# Patient Record
Sex: MALE | Race: WHITE | NOT HISPANIC OR LATINO | URBAN - METROPOLITAN AREA
[De-identification: names, ages, dates, MRNs, and addresses within clinical notes are randomized per-mention and may not be internally consistent; named-entity substitution may affect disease eponyms.]

---

## 2023-08-29 ENCOUNTER — AMBULATORY SURGICAL CENTER (OUTPATIENT)
Dept: URBAN - METROPOLITAN AREA SURGERY 3 | Facility: SURGERY | Age: 53
End: 2023-08-29
Payer: COMMERCIAL

## 2023-08-29 ENCOUNTER — OFFICE (OUTPATIENT)
Dept: URBAN - METROPOLITAN AREA PATHOLOGY 12 | Facility: PATHOLOGY | Age: 53
End: 2023-08-29
Payer: COMMERCIAL

## 2023-08-29 VITALS
OXYGEN SATURATION: 98 % | SYSTOLIC BLOOD PRESSURE: 114 MMHG | RESPIRATION RATE: 17 BRPM | OXYGEN SATURATION: 98 % | SYSTOLIC BLOOD PRESSURE: 148 MMHG | HEART RATE: 87 BPM | DIASTOLIC BLOOD PRESSURE: 80 MMHG | TEMPERATURE: 97.3 F | HEART RATE: 79 BPM | OXYGEN SATURATION: 92 % | HEART RATE: 88 BPM | DIASTOLIC BLOOD PRESSURE: 77 MMHG | HEART RATE: 89 BPM | OXYGEN SATURATION: 92 % | DIASTOLIC BLOOD PRESSURE: 77 MMHG | TEMPERATURE: 97.3 F | DIASTOLIC BLOOD PRESSURE: 79 MMHG | RESPIRATION RATE: 19 BRPM | SYSTOLIC BLOOD PRESSURE: 114 MMHG | OXYGEN SATURATION: 94 % | HEART RATE: 82 BPM | HEART RATE: 82 BPM | SYSTOLIC BLOOD PRESSURE: 118 MMHG | DIASTOLIC BLOOD PRESSURE: 67 MMHG | WEIGHT: 225 LBS | SYSTOLIC BLOOD PRESSURE: 130 MMHG | RESPIRATION RATE: 17 BRPM | DIASTOLIC BLOOD PRESSURE: 74 MMHG | RESPIRATION RATE: 18 BRPM | RESPIRATION RATE: 17 BRPM | HEART RATE: 87 BPM | HEART RATE: 82 BPM | OXYGEN SATURATION: 94 % | SYSTOLIC BLOOD PRESSURE: 127 MMHG | SYSTOLIC BLOOD PRESSURE: 118 MMHG | WEIGHT: 225 LBS | DIASTOLIC BLOOD PRESSURE: 74 MMHG | TEMPERATURE: 97.7 F | HEART RATE: 89 BPM | SYSTOLIC BLOOD PRESSURE: 117 MMHG | HEART RATE: 79 BPM | HEART RATE: 89 BPM | SYSTOLIC BLOOD PRESSURE: 130 MMHG | DIASTOLIC BLOOD PRESSURE: 80 MMHG | DIASTOLIC BLOOD PRESSURE: 80 MMHG | DIASTOLIC BLOOD PRESSURE: 79 MMHG | SYSTOLIC BLOOD PRESSURE: 130 MMHG | TEMPERATURE: 97.3 F | OXYGEN SATURATION: 96 % | SYSTOLIC BLOOD PRESSURE: 117 MMHG | SYSTOLIC BLOOD PRESSURE: 114 MMHG | SYSTOLIC BLOOD PRESSURE: 117 MMHG | HEIGHT: 69 IN | OXYGEN SATURATION: 98 % | SYSTOLIC BLOOD PRESSURE: 118 MMHG | HEART RATE: 87 BPM | DIASTOLIC BLOOD PRESSURE: 67 MMHG | HEART RATE: 88 BPM | HEART RATE: 88 BPM | HEIGHT: 69 IN | HEART RATE: 79 BPM | OXYGEN SATURATION: 96 % | TEMPERATURE: 97.7 F | TEMPERATURE: 97.7 F | SYSTOLIC BLOOD PRESSURE: 148 MMHG | OXYGEN SATURATION: 92 % | DIASTOLIC BLOOD PRESSURE: 77 MMHG | OXYGEN SATURATION: 96 % | DIASTOLIC BLOOD PRESSURE: 79 MMHG | RESPIRATION RATE: 18 BRPM | DIASTOLIC BLOOD PRESSURE: 67 MMHG | SYSTOLIC BLOOD PRESSURE: 127 MMHG | SYSTOLIC BLOOD PRESSURE: 127 MMHG | DIASTOLIC BLOOD PRESSURE: 74 MMHG | RESPIRATION RATE: 19 BRPM | HEIGHT: 69 IN | RESPIRATION RATE: 19 BRPM | OXYGEN SATURATION: 94 % | SYSTOLIC BLOOD PRESSURE: 148 MMHG | WEIGHT: 225 LBS | RESPIRATION RATE: 18 BRPM

## 2023-08-29 DIAGNOSIS — K63.5 POLYP OF COLON: ICD-10-CM

## 2023-08-29 DIAGNOSIS — D12.4 BENIGN NEOPLASM OF DESCENDING COLON: ICD-10-CM

## 2023-08-29 DIAGNOSIS — D12.2 BENIGN NEOPLASM OF ASCENDING COLON: ICD-10-CM

## 2023-08-29 DIAGNOSIS — Z12.11 ENCOUNTER FOR SCREENING FOR MALIGNANT NEOPLASM OF COLON: ICD-10-CM

## 2023-08-29 PROCEDURE — 88305 TISSUE EXAM BY PATHOLOGIST: CPT

## 2023-08-29 PROCEDURE — 45385 COLONOSCOPY W/LESION REMOVAL: CPT | Mod: 33 | Performed by: STUDENT IN AN ORGANIZED HEALTH CARE EDUCATION/TRAINING PROGRAM

## 2023-08-29 NOTE — SERVICEHPINOTES
Mr. Francisco Hooper is a 52 yo male who was referred for routine screening colonoscopy with no prior history of colonoscopy performed.  Patient denies prior positive Stool DNA or Hemoccult tests.   Patient is not currently taking Coumadin, Plavix or any other blood thinners. Patient denies history of heart or lung problems. Patient denies history of chest pain or shortness of breath. No history of kidney disease, renal dialysis, bleeding problems, or liver disease. Denies any hospitalizations or operations in the last six months. No history of rectal bleeding. Patient denies any other GI symptoms. Not on blood thinners, No FHx of CRC. No previous abd surgeries

## 2023-08-29 NOTE — SERVICEHPINOTES
Mr. Francisco Hooper is a 54 yo male who was referred for routine screening colonoscopy with no prior history of colonoscopy performed.  Patient denies prior positive Stool DNA or Hemoccult tests.   Patient is not currently taking Coumadin, Plavix or any other blood thinners. Patient denies history of heart or lung problems. Patient denies history of chest pain or shortness of breath. No history of kidney disease, renal dialysis, bleeding problems, or liver disease. Denies any hospitalizations or operations in the last six months. No history of rectal bleeding. Patient denies any other GI symptoms. Not on blood thinners, No FHx of CRC. No previous abd surgeries

## 2023-08-31 LAB
GASTRO ONE PATHOLOGY: PDF REPORT: (no result)